# Patient Record
Sex: MALE | Race: WHITE | URBAN - METROPOLITAN AREA
[De-identification: names, ages, dates, MRNs, and addresses within clinical notes are randomized per-mention and may not be internally consistent; named-entity substitution may affect disease eponyms.]

---

## 2017-04-26 ENCOUNTER — OFFICE VISIT (OUTPATIENT)
Dept: INTERNAL MEDICINE | Age: 47
End: 2017-04-26

## 2017-04-26 VITALS
WEIGHT: 188 LBS | OXYGEN SATURATION: 98 % | SYSTOLIC BLOOD PRESSURE: 134 MMHG | HEART RATE: 73 BPM | DIASTOLIC BLOOD PRESSURE: 82 MMHG

## 2017-04-26 DIAGNOSIS — Z11.4 SCREENING FOR HIV WITHOUT PRESENCE OF RISK FACTORS: ICD-10-CM

## 2017-04-26 DIAGNOSIS — Z00.00 ROUTINE GENERAL MEDICAL EXAMINATION AT A HEALTH CARE FACILITY: Primary | ICD-10-CM

## 2017-04-26 DIAGNOSIS — F41.9 ANXIETY: ICD-10-CM

## 2017-04-26 DIAGNOSIS — L30.9 DERMATITIS: ICD-10-CM

## 2017-04-26 DIAGNOSIS — F10.20 ALCOHOLISM (HCC): ICD-10-CM

## 2017-04-26 PROCEDURE — 99386 PREV VISIT NEW AGE 40-64: CPT | Performed by: INTERNAL MEDICINE

## 2017-04-26 PROCEDURE — 93000 ELECTROCARDIOGRAM COMPLETE: CPT | Performed by: INTERNAL MEDICINE

## 2017-04-26 ASSESSMENT — ENCOUNTER SYMPTOMS
APNEA: 0
FACIAL SWELLING: 0
EYE ITCHING: 0
PHOTOPHOBIA: 0
EYE PAIN: 0
CONSTIPATION: 0
BLOOD IN STOOL: 0
VOMITING: 0
ANAL BLEEDING: 0
RECTAL PAIN: 0
COUGH: 0
WHEEZING: 0
NAUSEA: 0
DIARRHEA: 0
STRIDOR: 0
TROUBLE SWALLOWING: 0
ABDOMINAL PAIN: 0
VOICE CHANGE: 0
BACK PAIN: 0
SHORTNESS OF BREATH: 0
CHOKING: 0
SORE THROAT: 0
ABDOMINAL DISTENTION: 0
COLOR CHANGE: 0
RHINORRHEA: 0
SINUS PRESSURE: 1
EYE DISCHARGE: 0
EYE REDNESS: 0
CHEST TIGHTNESS: 0

## 2017-05-01 DIAGNOSIS — Z00.00 ROUTINE GENERAL MEDICAL EXAMINATION AT A HEALTH CARE FACILITY: ICD-10-CM

## 2017-05-01 DIAGNOSIS — Z11.4 SCREENING FOR HIV WITHOUT PRESENCE OF RISK FACTORS: ICD-10-CM

## 2017-05-01 LAB
A/G RATIO: 2.2 (ref 1.1–2.2)
ALBUMIN SERPL-MCNC: 4.6 G/DL (ref 3.4–5)
ALP BLD-CCNC: 52 U/L (ref 40–129)
ALT SERPL-CCNC: 24 U/L (ref 10–40)
ANION GAP SERPL CALCULATED.3IONS-SCNC: 14 MMOL/L (ref 3–16)
AST SERPL-CCNC: 14 U/L (ref 15–37)
BASOPHILS ABSOLUTE: 0 K/UL (ref 0–0.2)
BASOPHILS RELATIVE PERCENT: 0.7 %
BILIRUB SERPL-MCNC: 0.6 MG/DL (ref 0–1)
BUN BLDV-MCNC: 12 MG/DL (ref 7–20)
CALCIUM SERPL-MCNC: 9.5 MG/DL (ref 8.3–10.6)
CHLORIDE BLD-SCNC: 106 MMOL/L (ref 99–110)
CHOLESTEROL, TOTAL: 198 MG/DL (ref 0–199)
CO2: 26 MMOL/L (ref 21–32)
CREAT SERPL-MCNC: 1 MG/DL (ref 0.9–1.3)
EOSINOPHILS ABSOLUTE: 0.1 K/UL (ref 0–0.6)
EOSINOPHILS RELATIVE PERCENT: 1.5 %
GFR AFRICAN AMERICAN: >60
GFR NON-AFRICAN AMERICAN: >60
GLOBULIN: 2.1 G/DL
GLUCOSE BLD-MCNC: 89 MG/DL (ref 70–99)
HCT VFR BLD CALC: 43.8 % (ref 40.5–52.5)
HDLC SERPL-MCNC: 35 MG/DL (ref 40–60)
HEMOGLOBIN: 14.9 G/DL (ref 13.5–17.5)
LDL CHOLESTEROL CALCULATED: 122 MG/DL
LYMPHOCYTES ABSOLUTE: 2.1 K/UL (ref 1–5.1)
LYMPHOCYTES RELATIVE PERCENT: 41.1 %
MCH RBC QN AUTO: 31.5 PG (ref 26–34)
MCHC RBC AUTO-ENTMCNC: 34 G/DL (ref 31–36)
MCV RBC AUTO: 92.6 FL (ref 80–100)
MONOCYTES ABSOLUTE: 0.4 K/UL (ref 0–1.3)
MONOCYTES RELATIVE PERCENT: 8.4 %
NEUTROPHILS ABSOLUTE: 2.5 K/UL (ref 1.7–7.7)
NEUTROPHILS RELATIVE PERCENT: 48.3 %
PDW BLD-RTO: 13.5 % (ref 12.4–15.4)
PLATELET # BLD: 184 K/UL (ref 135–450)
PMV BLD AUTO: 9.3 FL (ref 5–10.5)
POTASSIUM SERPL-SCNC: 3.8 MMOL/L (ref 3.5–5.1)
PROSTATE SPECIFIC ANTIGEN: 0.92 NG/ML (ref 0–4)
RBC # BLD: 4.73 M/UL (ref 4.2–5.9)
SODIUM BLD-SCNC: 146 MMOL/L (ref 136–145)
TOTAL PROTEIN: 6.7 G/DL (ref 6.4–8.2)
TRIGL SERPL-MCNC: 207 MG/DL (ref 0–150)
TSH SERPL DL<=0.05 MIU/L-ACNC: 2.86 UIU/ML (ref 0.27–4.2)
VLDLC SERPL CALC-MCNC: 41 MG/DL
WBC # BLD: 5.2 K/UL (ref 4–11)

## 2017-05-03 LAB — HIV-1 AND HIV-2 ANTIBODIES: NEGATIVE

## 2017-06-29 ENCOUNTER — TELEPHONE (OUTPATIENT)
Dept: INTERNAL MEDICINE | Age: 47
End: 2017-06-29

## 2017-11-21 ENCOUNTER — OFFICE VISIT (OUTPATIENT)
Dept: DERMATOLOGY | Age: 47
End: 2017-11-21

## 2017-11-21 DIAGNOSIS — D23.9 ANGIOKERATOMA OF FORDYCE: ICD-10-CM

## 2017-11-21 DIAGNOSIS — L82.1 SK (SEBORRHEIC KERATOSIS): Primary | ICD-10-CM

## 2017-11-21 DIAGNOSIS — B35.6 TINEA CRURIS: ICD-10-CM

## 2017-11-21 DIAGNOSIS — D22.9 MULTIPLE NEVI: ICD-10-CM

## 2017-11-21 DIAGNOSIS — B35.4 TINEA CORPORIS: ICD-10-CM

## 2017-11-21 PROCEDURE — 99202 OFFICE O/P NEW SF 15 MIN: CPT | Performed by: DERMATOLOGY

## 2017-11-21 RX ORDER — TERBINAFINE HYDROCHLORIDE 250 MG/1
250 TABLET ORAL DAILY
Qty: 14 TABLET | Refills: 0 | Status: SHIPPED | OUTPATIENT
Start: 2017-11-21 | End: 2017-12-05

## 2018-03-08 ENCOUNTER — EMPLOYEE WELLNESS (OUTPATIENT)
Dept: OTHER | Age: 48
End: 2018-03-08

## 2018-03-08 LAB
CHOLESTEROL, TOTAL: 174 MG/DL (ref 0–199)
GLUCOSE BLD-MCNC: 99 MG/DL (ref 70–99)
HDLC SERPL-MCNC: 32 MG/DL (ref 40–60)
LDL CHOLESTEROL CALCULATED: 107 MG/DL
TRIGL SERPL-MCNC: 174 MG/DL (ref 0–150)

## 2018-04-02 VITALS — WEIGHT: 183 LBS

## 2018-09-26 PROBLEM — Z00.00 ROUTINE GENERAL MEDICAL EXAMINATION AT A HEALTH CARE FACILITY: Status: RESOLVED | Noted: 2017-04-26 | Resolved: 2018-09-26

## 2018-10-31 ENCOUNTER — TELEPHONE (OUTPATIENT)
Dept: FAMILY MEDICINE CLINIC | Age: 48
End: 2018-10-31

## 2018-11-02 ENCOUNTER — OFFICE VISIT (OUTPATIENT)
Dept: FAMILY MEDICINE CLINIC | Age: 48
End: 2018-11-02
Payer: COMMERCIAL

## 2018-11-02 VITALS
SYSTOLIC BLOOD PRESSURE: 150 MMHG | TEMPERATURE: 97.9 F | DIASTOLIC BLOOD PRESSURE: 90 MMHG | WEIGHT: 192.2 LBS | HEIGHT: 71 IN | BODY MASS INDEX: 26.91 KG/M2 | RESPIRATION RATE: 12 BRPM | HEART RATE: 60 BPM | OXYGEN SATURATION: 99 %

## 2018-11-02 DIAGNOSIS — Z00.00 WELL ADULT EXAM: Primary | ICD-10-CM

## 2018-11-02 DIAGNOSIS — I10 BENIGN HYPERTENSION: ICD-10-CM

## 2018-11-02 PROBLEM — F10.20 ALCOHOLISM (HCC): Status: RESOLVED | Noted: 2017-04-26 | Resolved: 2018-11-02

## 2018-11-02 PROCEDURE — 99396 PREV VISIT EST AGE 40-64: CPT | Performed by: FAMILY MEDICINE

## 2018-11-02 RX ORDER — LISINOPRIL 20 MG/1
20 TABLET ORAL DAILY
Qty: 30 TABLET | Refills: 5 | Status: SHIPPED | OUTPATIENT
Start: 2018-11-02 | End: 2019-04-12 | Stop reason: SDUPTHER

## 2018-11-02 RX ORDER — DEXTROAMPHETAMINE SACCHARATE, AMPHETAMINE ASPARTATE, DEXTROAMPHETAMINE SULFATE AND AMPHETAMINE SULFATE 2.5; 2.5; 2.5; 2.5 MG/1; MG/1; MG/1; MG/1
5 TABLET ORAL 2 TIMES DAILY
COMMUNITY

## 2018-11-02 ASSESSMENT — ENCOUNTER SYMPTOMS
GASTROINTESTINAL NEGATIVE: 1
RESPIRATORY NEGATIVE: 1

## 2018-11-02 NOTE — PROGRESS NOTES
Subjective:      Patient ID: Reece Krueger is a 49 y.o. male. HPI   Pt is a of 49 y.o. male comes in today with   Chief Complaint   Patient presents with   Suzy Hernandez Establish Care     establish care, requesting physical     Here to establish. blood pressure has been running high at home. Quit drinking a few years ago. Not doing much exercise  Works 60-70 hours/week. On the road a lot. Craves sugar. Past Medical History:Reviewed  Medications:Reviewed. No Known Allergies   Social hx:Reviewed. History   Smoking Status    Former Smoker    Packs/day: 0.75    Years: 30.00   Smokeless Tobacco    Never Used     Review of Systems   Constitutional: Negative. Respiratory: Negative. Cardiovascular: Negative. Gastrointestinal: Negative. Genitourinary: Negative. Psychiatric/Behavioral: Negative for decreased concentration. Vitals:    11/02/18 1323   BP: (!) 150/90   Pulse: 60   Resp: 12   Temp: 97.9 °F (36.6 °C)   SpO2: 99%      Objective:   Physical Exam   Constitutional: He is oriented to person, place, and time. He appears well-developed and well-nourished. HENT:   Head: Normocephalic and atraumatic. Right Ear: Tympanic membrane, external ear and ear canal normal.   Left Ear: Tympanic membrane, external ear and ear canal normal.   Mouth/Throat: Oropharynx is clear and moist.   Eyes: Conjunctivae are normal. No scleral icterus. Neck: Normal range of motion. Neck supple. No thyromegaly present. Cardiovascular: Normal rate, regular rhythm and normal heart sounds. No murmur heard. Pulmonary/Chest: Effort normal and breath sounds normal. He has no wheezes. He has no rales. Abdominal: Soft. Bowel sounds are normal. He exhibits no distension. There is no splenomegaly or hepatomegaly. There is no tenderness. Musculoskeletal: He exhibits no edema. Neurological: He is alert and oriented to person, place, and time. He has normal reflexes. No cranial nerve deficit. Skin: Skin is warm and dry.

## 2018-11-14 DIAGNOSIS — I10 BENIGN HYPERTENSION: ICD-10-CM

## 2018-11-14 DIAGNOSIS — Z00.00 WELL ADULT EXAM: ICD-10-CM

## 2018-11-14 LAB
A/G RATIO: 2.1 (ref 1.1–2.2)
ALBUMIN SERPL-MCNC: 4.7 G/DL (ref 3.4–5)
ALP BLD-CCNC: 54 U/L (ref 40–129)
ALT SERPL-CCNC: 39 U/L (ref 10–40)
ANION GAP SERPL CALCULATED.3IONS-SCNC: 11 MMOL/L (ref 3–16)
AST SERPL-CCNC: 19 U/L (ref 15–37)
BILIRUB SERPL-MCNC: 0.7 MG/DL (ref 0–1)
BUN BLDV-MCNC: 16 MG/DL (ref 7–20)
CALCIUM SERPL-MCNC: 9.5 MG/DL (ref 8.3–10.6)
CHLORIDE BLD-SCNC: 105 MMOL/L (ref 99–110)
CHOLESTEROL, TOTAL: 214 MG/DL (ref 0–199)
CO2: 25 MMOL/L (ref 21–32)
CREAT SERPL-MCNC: 1.1 MG/DL (ref 0.9–1.3)
GFR AFRICAN AMERICAN: >60
GFR NON-AFRICAN AMERICAN: >60
GLOBULIN: 2.2 G/DL
GLUCOSE BLD-MCNC: 92 MG/DL (ref 70–99)
HCT VFR BLD CALC: 45.3 % (ref 40.5–52.5)
HDLC SERPL-MCNC: 37 MG/DL (ref 40–60)
HEMOGLOBIN: 15.3 G/DL (ref 13.5–17.5)
LDL CHOLESTEROL CALCULATED: 146 MG/DL
MCH RBC QN AUTO: 31.6 PG (ref 26–34)
MCHC RBC AUTO-ENTMCNC: 33.8 G/DL (ref 31–36)
MCV RBC AUTO: 93.3 FL (ref 80–100)
PDW BLD-RTO: 13.1 % (ref 12.4–15.4)
PLATELET # BLD: 184 K/UL (ref 135–450)
PMV BLD AUTO: 9.5 FL (ref 5–10.5)
POTASSIUM SERPL-SCNC: 4.2 MMOL/L (ref 3.5–5.1)
RBC # BLD: 4.85 M/UL (ref 4.2–5.9)
SODIUM BLD-SCNC: 141 MMOL/L (ref 136–145)
TOTAL PROTEIN: 6.9 G/DL (ref 6.4–8.2)
TRIGL SERPL-MCNC: 155 MG/DL (ref 0–150)
TSH SERPL DL<=0.05 MIU/L-ACNC: 1.67 UIU/ML (ref 0.27–4.2)
VLDLC SERPL CALC-MCNC: 31 MG/DL
WBC # BLD: 5.2 K/UL (ref 4–11)

## 2019-04-12 RX ORDER — LISINOPRIL 20 MG/1
TABLET ORAL
Qty: 90 TABLET | Refills: 0 | Status: SHIPPED | OUTPATIENT
Start: 2019-04-12 | End: 2019-07-25 | Stop reason: SDUPTHER

## 2019-07-25 RX ORDER — LISINOPRIL 20 MG/1
20 TABLET ORAL DAILY
Qty: 30 TABLET | Refills: 0 | Status: SHIPPED | OUTPATIENT
Start: 2019-07-25 | End: 2021-03-03 | Stop reason: SDUPTHER

## 2019-07-25 NOTE — TELEPHONE ENCOUNTER
Last Fill 4/12/19  Last Office Visit 11/2/18  Return in about 6 months (around 5/2/2019).    No Pending Appointments

## 2019-08-22 ENCOUNTER — OFFICE VISIT (OUTPATIENT)
Dept: FAMILY MEDICINE CLINIC | Age: 49
End: 2019-08-22

## 2019-08-22 VITALS
WEIGHT: 193.4 LBS | SYSTOLIC BLOOD PRESSURE: 128 MMHG | HEART RATE: 56 BPM | DIASTOLIC BLOOD PRESSURE: 82 MMHG | OXYGEN SATURATION: 99 % | BODY MASS INDEX: 27.07 KG/M2 | HEIGHT: 71 IN

## 2019-08-22 DIAGNOSIS — I10 BENIGN HYPERTENSION: Primary | ICD-10-CM

## 2019-08-22 DIAGNOSIS — Z12.11 COLON CANCER SCREENING: ICD-10-CM

## 2019-08-22 PROCEDURE — 99212 OFFICE O/P EST SF 10 MIN: CPT | Performed by: FAMILY MEDICINE

## 2019-08-22 RX ORDER — LISINOPRIL 20 MG/1
20 TABLET ORAL DAILY
Qty: 90 TABLET | Refills: 1 | Status: SHIPPED | OUTPATIENT
Start: 2019-08-22 | End: 2020-02-17

## 2019-08-22 ASSESSMENT — PATIENT HEALTH QUESTIONNAIRE - PHQ9
SUM OF ALL RESPONSES TO PHQ9 QUESTIONS 1 & 2: 0
1. LITTLE INTEREST OR PLEASURE IN DOING THINGS: 0
2. FEELING DOWN, DEPRESSED OR HOPELESS: 0
SUM OF ALL RESPONSES TO PHQ QUESTIONS 1-9: 0
SUM OF ALL RESPONSES TO PHQ QUESTIONS 1-9: 0

## 2019-08-22 NOTE — PROGRESS NOTES
Subjective:      Patient ID: Jeffery Gray is a 51 y.o. male. HPI   Pt is a of 51 y.o. male comes in today with   Chief Complaint   Patient presents with    Medication Check     Patient is here for medication check and refill. Here for med check  Active but exercise not as good recently. Diet good except when traveling a lot. Past Medical History:Reviewed  Medications:Reviewed. No Known Allergies   Social hx:Reviewed. Social History     Tobacco Use   Smoking Status Former Smoker    Packs/day: 0.75    Years: 30.00    Pack years: 22.50   Smokeless Tobacco Never Used        Vitals:    08/22/19 0840   BP: 128/82   Site: Left Upper Arm   Position: Sitting   Cuff Size: Medium Adult   Pulse: 56   SpO2: 99%   Weight: 193 lb 6.4 oz (87.7 kg)   Height: 5' 11\" (1.803 m)        Review of Systems    Objective:   Physical Exam    Assessment:       Diagnosis Orders   1. Benign hypertension     2. Colon cancer screening  POCT Fecal Immunochemical Test (FIT)          Plan:      The current medical regimen is effective;  continue present plan and medications.   Will do fit test        Danish Maier MD

## 2020-02-17 RX ORDER — LISINOPRIL 20 MG/1
20 TABLET ORAL DAILY
Qty: 90 TABLET | Refills: 1 | Status: SHIPPED | OUTPATIENT
Start: 2020-02-17 | End: 2020-07-14

## 2020-07-14 RX ORDER — LISINOPRIL 20 MG/1
TABLET ORAL
Qty: 90 TABLET | Refills: 0 | Status: SHIPPED | OUTPATIENT
Start: 2020-07-14 | End: 2020-11-13

## 2020-11-13 RX ORDER — LISINOPRIL 20 MG/1
TABLET ORAL
Qty: 90 TABLET | Refills: 0 | Status: SHIPPED | OUTPATIENT
Start: 2020-11-13 | End: 2021-03-03 | Stop reason: SDUPTHER

## 2021-02-19 RX ORDER — LISINOPRIL 20 MG/1
TABLET ORAL
Qty: 90 TABLET | Refills: 0 | OUTPATIENT
Start: 2021-02-19

## 2021-02-25 ENCOUNTER — TELEPHONE (OUTPATIENT)
Dept: FAMILY MEDICINE CLINIC | Age: 51
End: 2021-02-25

## 2021-02-25 NOTE — TELEPHONE ENCOUNTER
Patient informed that his medication request was denied as he needs an appointment. Has not been seen since 08/2019, patient did agree to schedule an appointment but he will be self-pay and is requesting verification on how much the appointment will be emailed to him. I did inform him that we are unable to email patients due to HIPAA concerns so I would give him a call back. Spoke with PCP who stated that he typically bills a level 2, confirmed that self pay cost is a $40.00 charge at the time of visit and then he would be billed afterwards. Patient was informed of this and I told him that we could not guarantee how much the specific bill would cost but it could range anywhere from $50..00 depending on how much was covered during the appointment.

## 2021-03-03 ENCOUNTER — OFFICE VISIT (OUTPATIENT)
Dept: FAMILY MEDICINE CLINIC | Age: 51
End: 2021-03-03

## 2021-03-03 VITALS
WEIGHT: 199.6 LBS | BODY MASS INDEX: 27.94 KG/M2 | HEIGHT: 71 IN | OXYGEN SATURATION: 98 % | HEART RATE: 59 BPM | DIASTOLIC BLOOD PRESSURE: 78 MMHG | TEMPERATURE: 97.9 F | SYSTOLIC BLOOD PRESSURE: 130 MMHG

## 2021-03-03 DIAGNOSIS — I10 BENIGN HYPERTENSION: Primary | ICD-10-CM

## 2021-03-03 PROCEDURE — 99212 OFFICE O/P EST SF 10 MIN: CPT | Performed by: FAMILY MEDICINE

## 2021-03-03 RX ORDER — LISINOPRIL 20 MG/1
20 TABLET ORAL DAILY
Qty: 90 TABLET | Refills: 3 | Status: SHIPPED | OUTPATIENT
Start: 2021-03-03 | End: 2021-06-22 | Stop reason: SDUPTHER

## 2021-03-03 SDOH — ECONOMIC STABILITY: INCOME INSECURITY: HOW HARD IS IT FOR YOU TO PAY FOR THE VERY BASICS LIKE FOOD, HOUSING, MEDICAL CARE, AND HEATING?: NOT HARD AT ALL

## 2021-03-03 SDOH — ECONOMIC STABILITY: TRANSPORTATION INSECURITY
IN THE PAST 12 MONTHS, HAS THE LACK OF TRANSPORTATION KEPT YOU FROM MEDICAL APPOINTMENTS OR FROM GETTING MEDICATIONS?: NO

## 2021-03-03 SDOH — ECONOMIC STABILITY: FOOD INSECURITY: WITHIN THE PAST 12 MONTHS, YOU WORRIED THAT YOUR FOOD WOULD RUN OUT BEFORE YOU GOT MONEY TO BUY MORE.: NEVER TRUE

## 2021-03-03 SDOH — ECONOMIC STABILITY: TRANSPORTATION INSECURITY
IN THE PAST 12 MONTHS, HAS LACK OF TRANSPORTATION KEPT YOU FROM MEETINGS, WORK, OR FROM GETTING THINGS NEEDED FOR DAILY LIVING?: NO

## 2021-03-03 ASSESSMENT — PATIENT HEALTH QUESTIONNAIRE - PHQ9
SUM OF ALL RESPONSES TO PHQ QUESTIONS 1-9: 1
SUM OF ALL RESPONSES TO PHQ QUESTIONS 1-9: 1
1. LITTLE INTEREST OR PLEASURE IN DOING THINGS: 0

## 2021-03-03 NOTE — PROGRESS NOTES
Conchita Bro (:  1970) is a 45 y.o. male,Established patient, here for evaluation of the following chief complaint(s):  Follow-up (Patient is here for f/u on HTN and refill on medications.)      ASSESSMENT/PLAN:  1. Benign hypertension  -     BASIC METABOLIC PANEL; Future  -     Lipid Panel; Future  The current medical regimen is effective;  continue present plan and medications. Reviewed diet, exercise  Agrees to stool test. Declined colonoscopy and cologuard    No follow-ups on file. SUBJECTIVE/OBJECTIVE:  HPI   Pt is a of 45 y.o. male comes in today with   Chief Complaint   Patient presents with    Follow-up     Patient is here for f/u on HTN and refill on medications. follow up hypertension   Diet stable. Some red meat. No fried foods. Not exercising. Has been busy. 3 kids, 2 in grade school. Vitals:    21 0927   BP: 130/78   Site: Left Upper Arm   Position: Sitting   Cuff Size: Medium Adult   Pulse: 59   Temp: 97.9 °F (36.6 °C)   TempSrc: Temporal   SpO2: 98%   Weight: 199 lb 9.6 oz (90.5 kg)   Height: 5' 11\" (1.803 m)      Review of Systems    Physical Exam            An electronic signature was used to authenticate this note.     --Lamine Young MD

## 2021-04-29 NOTE — PROGRESS NOTES
[facility]  Adriano Sanchez MD  [address]  1970    49 y.o. male     Date of Visit: 11/21/2017    I was asked to see this patient by Jah Bolaños MD.    Chief Complaint: skin lesions and rash    History of Present Illness:    1. He complains of recently noted lesions on the back. 2.  He has multiple nevinot aware of any changes in size, color, or shape. 3.  He complains of a rash on the chest, groin and buttocks. 4.  He complains of multiple lesions on the scrotum. Review of Systems:  Skin: No other growths or changing moles. No other rash. Past Medical History, Family History, Surgical History, Medications and Allergies reviewed. Past Medical History:   Diagnosis Date    Alcoholism ([address]     Anxiety      Past Surgical History:   Procedure Laterality Date    ANTERIOR CRUCIATE LIGAMENT REPAIR Right     x 2       No Known Allergies  Outpatient Prescriptions Marked as Taking for the 11/21/17 encounter (Office Visit) with Junior Morton MD   Medication Sig Dispense Refill    terbinafine (LAMISIL) 250 MG tablet Take 1 tablet by mouth daily for 14 days 14 tablet 0    ciclopirox (LOPROX) 0.77 % cream Apply to affected areas on the chest, buttocks and groin twice daily for 4 weeks. 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 The following were examined and determined to be abnormal: Breast/axilla/chest and Genitalia/groin/buttocks. Well-appearing. 1.  Right mid back with to oval shaped verrucous brown plaques. Upper aspect of the back with few stuck on appearing waxy brown papules.     2.  Scattered on the trunk and to a lesser extent the extremities are multiple well-defined round to oval uniformly brown macules and few papules. 3.  Chest with an annular scaly erythematous patch. 4.  Inguinal folds and buttocks with annular scaly erythematous patches. 5.  Scrotum with multiple small round violaceous papules. He has very mild onychomycosis on the left 5th toe. Assessment and Plan     1. SK (seborrheic keratosis)     Reassurance. 2. Multiple nevi - benign appearing    Encouraged self skin examinations. 3. Tinea corporis     Loprox cream twice daily for 4 weeks. Lamisil 250 mg daily for 14 days. 4. Tinea cruris -     Loprox cream twice daily for 4 weeks. Lamisil as above. 5. Angiokeratoma of [place]     Reassurance. Return in about 1 year (around 11/21/2018). 169

## 2021-06-22 RX ORDER — LISINOPRIL 20 MG/1
20 TABLET ORAL DAILY
Qty: 30 TABLET | Refills: 0 | Status: SHIPPED | OUTPATIENT
Start: 2021-06-22 | End: 2022-04-13

## 2021-06-22 NOTE — TELEPHONE ENCOUNTER
Pt left to go out of town and left his medication at home. Pt needs to take it by tonight.     Please advise pt     Phone # [phone]    lisinopril (PRINIVIL;ZESTRIL) 20 MG tablet    Pedro Rai

## 2021-07-02 ENCOUNTER — OFFICE VISIT (OUTPATIENT)
Dept: FAMILY MEDICINE CLINIC | Age: 51
End: 2021-07-02

## 2021-07-02 VITALS
WEIGHT: 194 LBS | HEIGHT: 71 IN | HEART RATE: 62 BPM | DIASTOLIC BLOOD PRESSURE: 82 MMHG | SYSTOLIC BLOOD PRESSURE: 138 MMHG | OXYGEN SATURATION: 99 % | BODY MASS INDEX: 27.16 KG/M2

## 2021-07-02 DIAGNOSIS — I10 BENIGN HYPERTENSION: Primary | ICD-10-CM

## 2021-07-02 PROCEDURE — 99212 OFFICE O/P EST SF 10 MIN: CPT | Performed by: FAMILY MEDICINE

## 2021-07-02 NOTE — PROGRESS NOTES
Dory Caldwell (:  1970) is a 45 y.o. male,Established patient, here for evaluation of the following chief complaint(s):  Hypertension (BP check - been high )         ASSESSMENT/PLAN:  Feli Patel was seen today for hypertension. Diagnoses and all orders for this visit:    Benign hypertension     not well controlled. Just restarted lisinopril. Continue home monitoring. If still > 140/90 in 2 weeks will increase to 30 mg  Will work on improving exercise routine    No follow-ups on file. Subjective   SUBJECTIVE/OBJECTIVE:  MIRANDA   Pt is a of 45 y.o. male comes in today with   Chief Complaint   Patient presents with    Hypertension     BP check - been high      blood pressure 150-160/90-100s/  Was off lisinopril  Been back on med for a week. Vitals:    21 0846 21 0852   BP: (!) 140/80 138/82   Pulse: 62    SpO2: 99%    Weight: 194 lb (88 kg)    Height: 5' 11\" (1.803 m)        Review of Systems       Objective   Physical Exam         An electronic signature was used to authenticate this note.     --Nubia Mandujano MD

## 2021-09-09 ENCOUNTER — TELEPHONE (OUTPATIENT)
Dept: FAMILY MEDICINE CLINIC | Age: 51
End: 2021-09-09

## 2021-09-09 NOTE — TELEPHONE ENCOUNTER
Pt called wanting to know if someone could write him a Rx for Ivermectin. Advised pt that Dr. Yury Sena would be out until Tuesday. Pt then wanted to know if the NP would be able to write the Rx. Please call and advise if this can or cannot be completed.

## 2021-09-10 NOTE — TELEPHONE ENCOUNTER
Patient wants to know why not? States that it has won a [employer] that it has had more research done on it than the COVID vaccine. apologized to the patient and advised that I was just relaying the message that the provider had sent. Patient then wanted to know when Dr Yury Sena was going to be back in the office and I told him Tuesday.

## 2022-04-13 RX ORDER — LISINOPRIL 20 MG/1
TABLET ORAL
Qty: 90 TABLET | Refills: 0 | Status: SHIPPED | OUTPATIENT
Start: 2022-04-13 | End: 2022-08-24 | Stop reason: SDUPTHER

## 2022-04-13 NOTE — TELEPHONE ENCOUNTER
Called and LM for patient, advised we send over his refill to [facility].  Advised that he would need to make  an appt to be seen for future refills

## 2022-04-13 NOTE — TELEPHONE ENCOUNTER
Medication:   Requested Prescriptions     Pending Prescriptions Disp Refills    lisinopril (PRINIVIL;ZESTRIL) 20 MG tablet [Pharmacy Med Name: LISINOPRIL 20 MG TABLET] 90 tablet      Sig: TAKE ONE TABLET BY MOUTH DAILY        Last Filled: 06/22/2021     Patient Phone Number: [phone] (home)     Last appt: 7/2/2021   Next appt: Visit date not found    Last OARRS: No flowsheet data found.

## 2022-08-24 ENCOUNTER — OFFICE VISIT (OUTPATIENT)
Dept: FAMILY MEDICINE CLINIC | Age: 52
End: 2022-08-24
Payer: COMMERCIAL

## 2022-08-24 VITALS
OXYGEN SATURATION: 98 % | HEART RATE: 72 BPM | HEIGHT: 71 IN | SYSTOLIC BLOOD PRESSURE: 140 MMHG | BODY MASS INDEX: 27.13 KG/M2 | WEIGHT: 193.8 LBS | DIASTOLIC BLOOD PRESSURE: 90 MMHG

## 2022-08-24 DIAGNOSIS — I10 BENIGN HYPERTENSION: Primary | ICD-10-CM

## 2022-08-24 PROCEDURE — 99213 OFFICE O/P EST LOW 20 MIN: CPT | Performed by: FAMILY MEDICINE

## 2022-08-24 RX ORDER — LISINOPRIL 20 MG/1
20 TABLET ORAL DAILY
Qty: 90 TABLET | Refills: 1 | Status: SHIPPED | OUTPATIENT
Start: 2022-08-24 | End: 2022-09-03 | Stop reason: SDUPTHER

## 2022-08-24 SDOH — ECONOMIC STABILITY: FOOD INSECURITY: WITHIN THE PAST 12 MONTHS, YOU WORRIED THAT YOUR FOOD WOULD RUN OUT BEFORE YOU GOT MONEY TO BUY MORE.: NEVER TRUE

## 2022-08-24 SDOH — ECONOMIC STABILITY: FOOD INSECURITY: WITHIN THE PAST 12 MONTHS, THE FOOD YOU BOUGHT JUST DIDN'T LAST AND YOU DIDN'T HAVE MONEY TO GET MORE.: NEVER TRUE

## 2022-08-24 ASSESSMENT — PATIENT HEALTH QUESTIONNAIRE - PHQ9
SUM OF ALL RESPONSES TO PHQ9 QUESTIONS 1 & 2: 0
1. LITTLE INTEREST OR PLEASURE IN DOING THINGS: 0
2. FEELING DOWN, DEPRESSED OR HOPELESS: 0
SUM OF ALL RESPONSES TO PHQ QUESTIONS 1-9: 0

## 2022-08-24 ASSESSMENT — SOCIAL DETERMINANTS OF HEALTH (SDOH): HOW HARD IS IT FOR YOU TO PAY FOR THE VERY BASICS LIKE FOOD, HOUSING, MEDICAL CARE, AND HEATING?: NOT HARD AT ALL

## 2022-08-24 NOTE — PROGRESS NOTES
Ольга Mclaughlin (:  1970) is a 45 y.o. male,Established patient, here for evaluation of the following chief complaint(s):  Medication Refill and Health Maintenance         ASSESSMENT/PLAN:  Cherylene Leas was seen today for medication refill and health maintenance. Diagnoses and all orders for this visit:    Benign hypertension  -     Lipid Panel; Future  -     Comprehensive Metabolic Panel; Future   Not well controlled. Wants to continue current dose and will work on therapeutic lifestyle changes      No follow-ups on file. Subjective   SUBJECTIVE/OBJECTIVE:  HPI  Pt is a of 45 y.o. male comes in today with   Chief Complaint   Patient presents with    Medication Refill    Health Maintenance     Taking lisionpril. No headache or exertional symptoms. Diet has been okay. Tries to limit fatty meats. Gets some exercise. Vitals:    22 0953   BP: (!) 140/90   Site: Left Upper Arm   Position: Sitting   Cuff Size: Medium Adult   Pulse: 72   SpO2: 98%   Weight: 193 lb 12.8 oz (87.9 kg)   Height: 5' 11\" (1.803 m)      Review of Systems       Objective   Physical Exam  Constitutional:       Appearance: Normal appearance. Cardiovascular:      Rate and Rhythm: Normal rate and regular rhythm. Heart sounds: No murmur heard. Pulmonary:      Effort: Pulmonary effort is normal.      Breath sounds: Normal breath sounds. Neurological:      Mental Status: He is alert. An electronic signature was used to authenticate this note.     --Heidi Hu MD

## 2022-09-03 ENCOUNTER — TELEPHONE (OUTPATIENT)
Dept: FAMILY MEDICINE CLINIC | Age: 52
End: 2022-09-03

## 2022-09-03 RX ORDER — LISINOPRIL 20 MG/1
20 TABLET ORAL DAILY
Qty: 30 TABLET | Refills: 0 | Status: SHIPPED | OUTPATIENT
Start: 2022-09-03 | End: 2022-09-26 | Stop reason: SDUPTHER

## 2022-09-03 NOTE — TELEPHONE ENCOUNTER
Patient called on-call provider stating he forgot his lisinopril and traveled out of town. He is symptomatic and has headaches and is anxious. We will call in 30-day prescription to local pharmacy near him.

## 2022-09-19 ENCOUNTER — TELEPHONE (OUTPATIENT)
Dept: FAMILY MEDICINE CLINIC | Age: 52
End: 2022-09-19

## 2022-09-26 RX ORDER — LISINOPRIL 20 MG/1
20 TABLET ORAL DAILY
Qty: 90 TABLET | Refills: 1 | Status: SHIPPED | OUTPATIENT
Start: 2022-09-26

## 2023-02-15 RX ORDER — LISINOPRIL 20 MG/1
20 TABLET ORAL DAILY
Qty: 90 TABLET | Refills: 1 | Status: SHIPPED | OUTPATIENT
Start: 2023-02-15

## 2023-02-15 NOTE — TELEPHONE ENCOUNTER
Medication:   Requested Prescriptions     Pending Prescriptions Disp Refills    lisinopril (PRINIVIL;ZESTRIL) 20 MG tablet [Pharmacy Med Name: LISINOPRIL 20MG TABLETS] 90 tablet 1     Sig: TAKE 1 TABLET BY MOUTH DAILY        Last Filled:  9/26/22    Patient Phone Number: [phone] (home)     Last appt: 8/24/2022   Next appt: Visit date not found    Last OARRS: No flowsheet data found.

## 2023-08-07 RX ORDER — LISINOPRIL 20 MG/1
20 TABLET ORAL DAILY
Qty: 90 TABLET | Refills: 1 | Status: SHIPPED | OUTPATIENT
Start: 2023-08-07

## 2024-01-26 RX ORDER — LISINOPRIL 20 MG/1
20 TABLET ORAL DAILY
Qty: 90 TABLET | Refills: 1 | Status: SHIPPED | OUTPATIENT
Start: 2024-01-26

## 2024-01-26 NOTE — TELEPHONE ENCOUNTER
Medication:   Requested Prescriptions     Pending Prescriptions Disp Refills    lisinopril (PRINIVIL;ZESTRIL) 20 MG tablet [Pharmacy Med Name: LISINOPRIL 20MG TABLETS] 90 tablet 1     Sig: TAKE 1 TABLET BY MOUTH DAILY        Last Filled:  08/07/2023    Patient Phone Number: [phone] (home)     Last appt: 8/24/2022   Next appt: Visit date not found    Last OARRS:        No data to display

## 2024-07-30 RX ORDER — LISINOPRIL 20 MG/1
20 TABLET ORAL DAILY
Qty: 90 TABLET | Refills: 1 | OUTPATIENT
Start: 2024-07-30

## 2024-07-30 NOTE — TELEPHONE ENCOUNTER
Medication:   Requested Prescriptions     Pending Prescriptions Disp Refills    lisinopril (PRINIVIL;ZESTRIL) 20 MG tablet [Pharmacy Med Name: LISINOPRIL 20MG TABLETS] 90 tablet 1     Sig: TAKE 1 TABLET BY MOUTH DAILY       Last Filled:  1/26/24    Patient Phone Number: [phone] (home)     Last appt: 8/24/2022   Next appt: Visit date not found    Last Labs DM: No results found for: \"LABA1C\"  Last Lipid:   Lab Results   Component Value Date/Time    CHOL 214 11/14/2018 09:14 AM    TRIG 155 11/14/2018 09:14 AM    HDL 37 11/14/2018 09:14 AM     Last PSA:   Lab Results   Component Value Date/Time    PSA 0.92 05/01/2017 07:34 AM     Last Thyroid:   Lab Results   Component Value Date/Time    TSH 1.67 11/14/2018 09:14 AM

## 2024-07-30 NOTE — TELEPHONE ENCOUNTER
Called and told pt that rx refused because he's way over due for an appointment. He said he would call back and schedule

## 2024-09-01 ASSESSMENT — PATIENT HEALTH QUESTIONNAIRE - PHQ9
2. FEELING DOWN, DEPRESSED OR HOPELESS: NOT AT ALL
SUM OF ALL RESPONSES TO PHQ QUESTIONS 1-9: 0
1. LITTLE INTEREST OR PLEASURE IN DOING THINGS: NOT AT ALL
SUM OF ALL RESPONSES TO PHQ9 QUESTIONS 1 & 2: 0
1. LITTLE INTEREST OR PLEASURE IN DOING THINGS: NOT AT ALL
2. FEELING DOWN, DEPRESSED OR HOPELESS: NOT AT ALL
SUM OF ALL RESPONSES TO PHQ QUESTIONS 1-9: 0
SUM OF ALL RESPONSES TO PHQ9 QUESTIONS 1 & 2: 0

## 2024-09-04 ENCOUNTER — OFFICE VISIT (OUTPATIENT)
Dept: FAMILY MEDICINE CLINIC | Age: 54
End: 2024-09-04
Payer: COMMERCIAL

## 2024-09-04 VITALS
SYSTOLIC BLOOD PRESSURE: 160 MMHG | HEART RATE: 95 BPM | OXYGEN SATURATION: 98 % | WEIGHT: 190.2 LBS | DIASTOLIC BLOOD PRESSURE: 96 MMHG | HEIGHT: 71 IN | BODY MASS INDEX: 26.63 KG/M2

## 2024-09-04 DIAGNOSIS — I10 BENIGN HYPERTENSION: ICD-10-CM

## 2024-09-04 DIAGNOSIS — Z12.11 COLON CANCER SCREENING: ICD-10-CM

## 2024-09-04 DIAGNOSIS — Z00.00 WELL ADULT EXAM: Primary | ICD-10-CM

## 2024-09-04 PROCEDURE — 3080F DIAST BP >= 90 MM HG: CPT | Performed by: FAMILY MEDICINE

## 2024-09-04 PROCEDURE — 99396 PREV VISIT EST AGE 40-64: CPT | Performed by: FAMILY MEDICINE

## 2024-09-04 PROCEDURE — 3077F SYST BP >= 140 MM HG: CPT | Performed by: FAMILY MEDICINE

## 2024-09-04 RX ORDER — LISINOPRIL 20 MG/1
20 TABLET ORAL DAILY
Qty: 90 TABLET | Refills: 1 | Status: SHIPPED | OUTPATIENT
Start: 2024-09-04

## 2024-09-04 SDOH — ECONOMIC STABILITY: FOOD INSECURITY: WITHIN THE PAST 12 MONTHS, THE FOOD YOU BOUGHT JUST DIDN'T LAST AND YOU DIDN'T HAVE MONEY TO GET MORE.: NEVER TRUE

## 2024-09-04 SDOH — ECONOMIC STABILITY: INCOME INSECURITY: HOW HARD IS IT FOR YOU TO PAY FOR THE VERY BASICS LIKE FOOD, HOUSING, MEDICAL CARE, AND HEATING?: NOT HARD AT ALL

## 2024-09-04 ASSESSMENT — ENCOUNTER SYMPTOMS: RESPIRATORY NEGATIVE: 1

## 2024-09-04 NOTE — PROGRESS NOTES
Rodrigo Rodriguez (:  1970) is a 53 y.o. male,Established patient, here for evaluation of the following chief complaint(s):  Annual Exam      Assessment & Plan   ASSESSMENT/PLAN:  Rodrigo was seen today for annual exam.    Diagnoses and all orders for this visit:    Well adult exam  -     Lipid Panel; Future  -     Comprehensive Metabolic Panel; Future  Reviewed diet and exercise  Benign hypertension  -     Lipid Panel; Future  -     Comprehensive Metabolic Panel; Future  Not well controlled. Declined med adjustment.  Reviewed diet and exercise  Colon cancer screening  -     Fecal DNA Colorectal cancer screening (Cologuard)    Other orders  -     lisinopril (PRINIVIL;ZESTRIL) 20 MG tablet; Take 1 tablet by mouth daily         No follow-ups on file.         Subjective   SUBJECTIVE/OBJECTIVE:  HPI  Pt is a of 53 y.o. male comes in today with   Chief Complaint   Patient presents with    Annual Exam   Under a lot of stress. Taking med as prescribed  Vitals:    24 0812   BP: (!) 160/96   Pulse: 95   SpO2: 98%   Weight: 86.3 kg (190 lb 3.2 oz)   Height: 1.803 m (5' 11\")       Past Medical History:Reviewed  Medications:Reviewed.  No Known Allergies   Social hx:Reviewed.  Social History     Tobacco Use   Smoking Status Former    Current packs/day: 0.75    Average packs/day: 0.8 packs/day for 30.0 years (22.5 ttl pk-yrs)    Types: Cigarettes   Smokeless Tobacco Never        Review of Systems       Objective   Physical Exam  Constitutional:       General: He is not in acute distress.     Appearance: Normal appearance. He is well-developed. He is not diaphoretic.   HENT:      Head: Normocephalic and atraumatic.      Mouth/Throat:      Mouth: Mucous membranes are moist.      Pharynx: Oropharynx is clear.   Eyes:      General: No scleral icterus.  Neck:      Thyroid: No thyromegaly.      Trachea: No tracheal deviation.   Cardiovascular:      Rate and Rhythm: Normal rate and regular rhythm.      Heart sounds: Normal heart

## 2024-09-04 NOTE — PROGRESS NOTES
Rodrigo Rodriguez (:  1970) is a 53 y.o. male,Established patient, here for evaluation of the following chief complaint(s):  Annual Exam      Assessment & Plan   ASSESSMENT/PLAN:  There are no diagnoses linked to this encounter.     No follow-ups on file.         Subjective   SUBJECTIVE/OBJECTIVE:  HPI  Pt is a of 53 y.o. male comes in today with   Chief Complaint   Patient presents with    Annual Exam     A lot of stress with job, at home.  140s at home.  Went to more of a carnivore diet recently since struggles with carbs and sweets.  Does some intermittent fasting.  Exercise has been intermittent.    Vitals:    24 0812   Pulse: (!) 5   SpO2: 98%   Weight: 86.3 kg (190 lb 3.2 oz)   Height: 1.803 m (5' 11\")       Past Medical History:Reviewed  Medications:Reviewed.  No Known Allergies   Social hx:Reviewed.  Social History     Tobacco Use   Smoking Status Former    Current packs/day: 0.75    Average packs/day: 0.8 packs/day for 30.0 years (22.5 ttl pk-yrs)    Types: Cigarettes   Smokeless Tobacco Never     Review of Systems   Constitutional: Negative.    Respiratory: Negative.     Cardiovascular: Negative.         Objective   Physical Exam         An electronic signature was used to authenticate this note.    --Tani Jasso MD

## 2024-12-13 NOTE — TELEPHONE ENCOUNTER
Medication:   Requested Prescriptions     Pending Prescriptions Disp Refills    lisinopril (PRINIVIL;ZESTRIL) 20 MG tablet [Pharmacy Med Name: LISINOPRIL 20MG TABLETS] 90 tablet 1     Sig: TAKE 1 TABLET BY MOUTH DAILY       Last Filled:  9/4/24    Patient Phone Number: [phone] (home)     Last appt: 9/4/2024   Next appt: Visit date not found    Last Labs DM: No results found for: \"LABA1C\"  Last Lipid:   Lab Results   Component Value Date/Time    CHOL 214 11/14/2018 09:14 AM    TRIG 155 11/14/2018 09:14 AM    HDL 37 11/14/2018 09:14 AM     Last PSA:   Lab Results   Component Value Date/Time    PSA 0.92 05/01/2017 07:34 AM     Last Thyroid:   Lab Results   Component Value Date/Time    TSH 1.67 11/14/2018 09:14 AM

## 2024-12-16 RX ORDER — LISINOPRIL 20 MG/1
20 TABLET ORAL DAILY
Qty: 90 TABLET | Refills: 1 | Status: SHIPPED | OUTPATIENT
Start: 2024-12-16

## 2025-03-26 NOTE — TELEPHONE ENCOUNTER
Medication:   Requested Prescriptions     Pending Prescriptions Disp Refills    lisinopril (PRINIVIL;ZESTRIL) 20 MG tablet 90 tablet 1     Sig: Take 1 tablet by mouth daily       Last Filled:  12/16/24    Patient Phone Number: [phone] (home)     Last appt: 9/4/2024   Next appt: Visit date not found    Last Labs DM: No results found for: \"LABA1C\"  Last Lipid:   Lab Results   Component Value Date/Time    CHOL 214 11/14/2018 09:14 AM    TRIG 155 11/14/2018 09:14 AM    HDL 37 11/14/2018 09:14 AM     Last PSA:   Lab Results   Component Value Date/Time    PSA 0.92 05/01/2017 07:34 AM     Last Thyroid:   Lab Results   Component Value Date/Time    TSH 1.67 11/14/2018 09:14 AM

## 2025-03-27 RX ORDER — LISINOPRIL 20 MG/1
20 TABLET ORAL DAILY
Qty: 30 TABLET | Refills: 0 | Status: SHIPPED | OUTPATIENT
Start: 2025-03-27

## 2025-03-28 NOTE — TELEPHONE ENCOUNTER
Pls let him know I sent a 30 day supply. I don't have blood work since 2018. Order is in the system

## 2025-03-31 ENCOUNTER — TELEPHONE (OUTPATIENT)
Dept: FAMILY MEDICINE CLINIC | Age: 55
End: 2025-03-31

## 2025-03-31 RX ORDER — LISINOPRIL 20 MG/1
20 TABLET ORAL DAILY
Qty: 30 TABLET | Refills: 0 | Status: SHIPPED | OUTPATIENT
Start: 2025-03-31

## 2025-03-31 RX ORDER — LISINOPRIL 20 MG/1
20 TABLET ORAL DAILY
Qty: 90 TABLET | OUTPATIENT
Start: 2025-03-31

## 2025-03-31 NOTE — TELEPHONE ENCOUNTER
First one went to wrong pharmacy. Pended to the right pharmacy            Medication:   Requested Prescriptions     Pending Prescriptions Disp Refills    lisinopril (PRINIVIL;ZESTRIL) 20 MG tablet 30 tablet 0     Sig: Take 1 tablet by mouth daily        Last Filled:      Pended to; [id number] [address] - P [phone] - F [phone]     Patient Phone Number: [phone] (home)     Last appt: 9/4/2024   Next appt: Visit date not found    Last OARRS:        No data to display

## 2025-04-01 RX ORDER — LISINOPRIL 20 MG/1
20 TABLET ORAL DAILY
Qty: 90 TABLET | OUTPATIENT
Start: 2025-04-01

## 2025-04-01 NOTE — TELEPHONE ENCOUNTER
Medication:   Requested Prescriptions     Pending Prescriptions Disp Refills    lisinopril (PRINIVIL;ZESTRIL) 20 MG tablet [Pharmacy Med Name: LISINOPRIL 20MG TABLETS] 90 tablet      Sig: TAKE 1 TABLET BY MOUTH DAILY       Last Filled:  3/31/25    Patient Phone Number: [phone] (home)     Last appt: 9/4/2024   Next appt: Visit date not found    Last Labs DM: No results found for: \"LABA1C\"  Last Lipid:   Lab Results   Component Value Date/Time    CHOL 214 11/14/2018 09:14 AM    TRIG 155 11/14/2018 09:14 AM    HDL 37 11/14/2018 09:14 AM     Last PSA:   Lab Results   Component Value Date/Time    PSA 0.92 05/01/2017 07:34 AM     Last Thyroid:   Lab Results   Component Value Date/Time    TSH 1.67 11/14/2018 09:14 AM

## 2025-04-17 LAB
ALBUMIN: 4.7 GM/DL (ref 3.5–5.2)
ALP BLD-CCNC: 55 U/L (ref 40–129)
ALT SERPL-CCNC: 37 U/L
ANION GAP SERPL CALCULATED.3IONS-SCNC: 13 MMOL/L (ref 7–16)
AST SERPL-CCNC: 18 U/L
BILIRUB SERPL-MCNC: 0.7 MG/DL (ref 0.2–1.4)
BUN BLDV-MCNC: 19 MG/DL (ref 6–20)
CALCIUM SERPL-MCNC: 9.6 MG/DL (ref 8.6–10.4)
CHLORIDE BLD-SCNC: 105 MMOL/L (ref 98–107)
CHOLESTEROL, TOTAL: 252 MG/DL
CO2: 25 MMOL/L (ref 22–29)
CREAT SERPL-MCNC: 1.17 MG/DL (ref 0.67–1.3)
EGFR (CKD-EPI): 74 ML/MIN/1.73 M2
GLUCOSE BLD-MCNC: 87 MG/DL (ref 70–99)
HDLC SERPL-MCNC: 43 MG/DL
LDL CHOLESTEROL: 184 MG/DL
NONHDLC SERPL-MCNC: 209 MG/DL
POTASSIUM SERPL-SCNC: 4.2 MMOL/L (ref 3.5–5)
SODIUM BLD-SCNC: 143 MMOL/L (ref 136–145)
TOTAL PROTEIN: 7 GM/DL (ref 6.4–8.3)
TRIGL SERPL-MCNC: 136 MG/DL

## 2025-04-22 ENCOUNTER — RESULTS FOLLOW-UP (OUTPATIENT)
Dept: FAMILY MEDICINE CLINIC | Age: 55
End: 2025-04-22

## 2025-04-24 ENCOUNTER — TELEPHONE (OUTPATIENT)
Dept: FAMILY MEDICINE CLINIC | Age: 55
End: 2025-04-24

## 2025-04-24 NOTE — TELEPHONE ENCOUNTER
Patient returned phone call to discuss blood test results. He would like someone to call him back.

## 2025-04-28 RX ORDER — LISINOPRIL 20 MG/1
20 TABLET ORAL DAILY
Qty: 30 TABLET | Refills: 0 | Status: SHIPPED | OUTPATIENT
Start: 2025-04-28

## 2025-04-28 NOTE — TELEPHONE ENCOUNTER
Medication:   Requested Prescriptions     Pending Prescriptions Disp Refills    lisinopril (PRINIVIL;ZESTRIL) 20 MG tablet [Pharmacy Med Name: LISINOPRIL 20MG TABLETS] 90 tablet      Sig: TAKE 1 TABLET BY MOUTH DAILY       Last Filled:  3/31/2025     Patient Phone Number: [phone] (home)     Last appt: 9/4/2024   Next appt: Visit date not found    Last Lipid:   Lab Results   Component Value Date/Time    CHOL 252 04/17/2025 10:30 AM    TRIG 136 04/17/2025 10:30 AM    HDL 43 04/17/2025 10:30 AM

## 2025-05-06 RX ORDER — LISINOPRIL 20 MG/1
20 TABLET ORAL DAILY
Qty: 30 TABLET | Refills: 0 | Status: SHIPPED | OUTPATIENT
Start: 2025-05-06

## 2025-05-06 NOTE — TELEPHONE ENCOUNTER
Medication:   Requested Prescriptions     Pending Prescriptions Disp Refills    lisinopril (PRINIVIL;ZESTRIL) 20 MG tablet [Pharmacy Med Name: LISINOPRIL 20MG TABLETS] 30 tablet 0     Sig: TAKE 1 TABLET BY MOUTH DAILY       Last Filled:  4/28/25    Patient Phone Number: [phone] (home)     Last appt: 9/4/2024   Next appt: Visit date not found    Last Labs DM: No results found for: \"LABA1C\"  Last Lipid:   Lab Results   Component Value Date/Time    CHOL 252 04/17/2025 10:30 AM    TRIG 136 04/17/2025 10:30 AM    HDL 43 04/17/2025 10:30 AM     Last PSA:   Lab Results   Component Value Date/Time    PSA 0.92 05/01/2017 07:34 AM     Last Thyroid:   Lab Results   Component Value Date/Time    TSH 1.67 11/14/2018 09:14 AM

## 2025-06-10 ENCOUNTER — PATIENT MESSAGE (OUTPATIENT)
Dept: FAMILY MEDICINE CLINIC | Age: 55
End: 2025-06-10

## 2025-06-10 RX ORDER — LISINOPRIL 20 MG/1
20 TABLET ORAL DAILY
Qty: 90 TABLET | Refills: 0 | Status: SHIPPED | OUTPATIENT
Start: 2025-06-10

## 2025-06-10 NOTE — TELEPHONE ENCOUNTER
Medication:   Requested Prescriptions     Pending Prescriptions Disp Refills    lisinopril (PRINIVIL;ZESTRIL) 20 MG tablet 90 tablet 0     Sig: Take 1 tablet by mouth daily        Last Filled:  5/6/25    Patient Phone Number: [phone] (home)     Last appt:  physical done 9/4/2024   Next appt: Visit date not found    Last OARRS:        No data to display                  
Quality 130: Documentation Of Current Medications In The Medical Record: Current Medications Documented
Quality 226: Preventive Care And Screening: Tobacco Use: Screening And Cessation Intervention: Patient screened for tobacco use and is an ex/non-smoker
Detail Level: Detailed

## 2025-06-11 RX ORDER — LISINOPRIL 20 MG/1
20 TABLET ORAL DAILY
Qty: 90 TABLET | Refills: 0 | OUTPATIENT
Start: 2025-06-11

## 2025-06-11 NOTE — TELEPHONE ENCOUNTER
Medication:   Requested Prescriptions     Pending Prescriptions Disp Refills    lisinopril (PRINIVIL;ZESTRIL) 20 MG tablet 90 tablet 0     Sig: Take 1 tablet by mouth daily       Last Filled:  6/10/25Duplicate request.    Patient Phone Number: [phone] (home)     Last appt: 9/4/2024   Next appt: Visit date not found    Last Labs DM: No results found for: \"LABA1C\"  Last Lipid:   Lab Results   Component Value Date/Time    CHOL 252 04/17/2025 10:30 AM    TRIG 136 04/17/2025 10:30 AM    HDL 43 04/17/2025 10:30 AM     Last PSA:   Lab Results   Component Value Date/Time    PSA 0.92 05/01/2017 07:34 AM     Last Thyroid:   Lab Results   Component Value Date/Time    TSH 1.67 11/14/2018 09:14 AM